# Patient Record
(demographics unavailable — no encounter records)

---

## 2024-10-17 NOTE — HISTORY OF PRESENT ILLNESS
[Y] : Positive pregnancy history [Currently Active] : currently active [Men] : men [No] : No [Mammogramdate] : 2019 [BoneDensityDate] : N/A [ColonoscopyDate] : N/A [LMPDate] : 08/15/24 [PGHxTotal] : 3 [[facility] : [facility] [FreeTextEntry1] : 3

## 2024-11-11 NOTE — ASSESSMENT
[FreeTextEntry1] : In summary, the patient is a 51-year-old woman who is in general asymptomatic and at essentially low risk for coronary disease.  She has no established hypertension diabetes or smoking there is no family history of premature coronary disease.  Her total cholesterol to HDL ratio is less than 4 and her LDL cholesterol is just over 100.  In addition she is premenopausal.  For now I have advised patient that no further workup is necessary at this time.  Have advised her to decrease her candy intake in hopes of reducing her triglyceride level and in hopes of her not developing overt diabetes.  For now I am returning her to the care of her primary physician she can be seen here on an as-needed basis

## 2024-11-11 NOTE — CARDIOLOGY SUMMARY
[de-identified] : 11/11/2024 EKGs done both in primary office and here reveals sinus rhythm and are normal.

## 2024-11-11 NOTE — REASON FOR VISIT
[Other: ____] : [unfilled] [FreeTextEntry1] : This is a 51-year-old woman referred by her primary physician for "a slight abnormality on her EKG".  The patient is in generally good health.  She is active exercising and has no complaints of chest discomfort shortness of breath palpitations dizziness or syncope.  She states that at some point her blood pressure might have been borderline.  She recently was found to have a hemoglobin A1c of 6 and a blood sugar of 126.  She is a non-smoker occasional alcohol user.  Most recent lipid profile reveals a total cholesterol 188 HDL 52 .  There is no significant family history of premature coronary artery disease.  The patient has not yet reached menopause.  Of note is the fact that the patient does eat a significant amount of candy and sweets and her triglyceride level is at 193.

## 2024-11-20 NOTE — HISTORY OF PRESENT ILLNESS
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

## 2024-11-20 NOTE — ASSESSMENT
[FreeTextEntry1] : PHILIP SANDERSON is a 51 year old female with a history of choledocholithiasis s/p ERCP with stone extraction and CCY  #Average risk for colon cancer #History of choledocholithiasis s/p ERCP and ductal clearance, s/p CCY #intermittent reflux symptoms  Recs: - discussed colon cancer screening and options, pt opted for colonoscopy - Benefits and risks of the procedure (including but not limited to pain, infection, bleeding, perforation, injury to internal organs, missed lesions, IV site problems, risks of anesthesia, possible need for further procedures including emergency surgery) were explained to the patient. Alternatives to the procedure were discussed. The patient was agreeable to proceed. - The patient was instructed on the colonoscopy prep, including low fiber diet x 1 week, clear liquid diet x 1 day prior to the procedure, NPO two hours prior to the procedure, and instructions for use of the prescribed prep itself. SUTAB ordered - no PST needed - OTC tums or Pepcid for intermittent reflux symptoms

## 2024-11-20 NOTE — PHYSICAL EXAM
[Alert] : alert [Normal Voice/Communication] : normal voice/communication [Healthy Appearing] : healthy appearing [No Acute Distress] : no acute distress [Sclera] : the sclera and conjunctiva were normal [Hearing Threshold Finger Rub Not Blanco] : hearing was normal [Normal Lips/Gums] : the lips and gums were normal [Oropharynx] : the oropharynx was normal [Normal Appearance] : the appearance of the neck was normal [No Neck Mass] : no neck mass was observed [No Respiratory Distress] : no respiratory distress [No Acc Muscle Use] : no accessory muscle use [Respiration, Rhythm And Depth] : normal respiratory rhythm and effort [Abdomen Tenderness] : non-tender [No Masses] : no abdominal mass palpated [Abdomen Soft] : soft [] : no hepatosplenomegaly [Oriented To Time, Place, And Person] : oriented to person, place, and time

## 2025-01-10 NOTE — ASSESSMENT
Patient would like a call back. States someone from [facility] came to her house and they gave the results to . she would like to over those results   [FreeTextEntry1] : # Aspiration Pt aspirated vomit during colonoscopy yesterday Today she was slightly tachycardic when she came into room, then improved with rest Normal O2 sat on room air Recommend get pulse ox, keep at home and check if symptoms worsen If she has mild worsening of symptoms (more cough, fever, etc) she should start Augmentin and see me Monday If she gets acute worse (severe SOB, etc) she should go to ED  f/u PRN

## 2025-01-10 NOTE — HISTORY OF PRESENT ILLNESS
[FreeTextEntry1] : f/u aspiration during colonoscopy [de-identified] : Pt comes in for f/u - yesterday had colonoscopy and I spoke with her GI who informed me she had episode of vomiting and aspiration during the procedure, which was then terminated early. Has been using incentive spirometer.   Pt feels throat is a bit sore. Drinking a lot of tea/warm liquids. She has a cough - bringing up small amounts of phlegm. Not really short of breath, but if takes deep breaths she feels a little pulling at bottom of lungs, some tenderness.

## 2025-01-10 NOTE — REVIEW OF SYSTEMS
[Sore Throat] : sore throat [Chest Pain] : no chest pain [Shortness Of Breath] : no shortness of breath [Cough] : cough [Dyspnea on Exertion] : no dyspnea on exertion [Negative] : Constitutional [FreeTextEntry6] : can cough up phlegm with hard coughs, small amount

## 2025-01-10 NOTE — PHYSICAL EXAM
[Normal] : no acute distress, well nourished, well developed and well-appearing [Normal Sclera/Conjunctiva] : normal sclera/conjunctiva [Normal Outer Ear/Nose] : the outer ears and nose were normal in appearance [No Respiratory Distress] : no respiratory distress  [No Accessory Muscle Use] : no accessory muscle use [Clear to Auscultation] : lungs were clear to auscultation bilaterally [Normal Affect] : the affect was normal [Alert and Oriented x3] : oriented to person, place, and time [Normal Insight/Judgement] : insight and judgment were intact

## 2025-01-13 NOTE — ASSESSMENT
[FreeTextEntry1] : # Aspiration PNA Pt started augmentin treatment last week per recommendation of GI after getting formal read of CXR done 1/9/25 which showed L lung opacity after she aspirated Feeling slightly better, no signs of progression of PNA Monitor on antibiotics for now If patient worsens (and I counseled her on red flag signs/symptoms) she will call me or go to ED We will repeat the CXR in 6 weeks to monitor for resolution  f/u PRN

## 2025-01-13 NOTE — PHYSICAL EXAM
[Normal] : no acute distress, well nourished, well developed and well-appearing [Normal Outer Ear/Nose] : the outer ears and nose were normal in appearance [No Respiratory Distress] : no respiratory distress  [Normal Affect] : the affect was normal [Alert and Oriented x3] : oriented to person, place, and time [Normal Insight/Judgement] : insight and judgment were intact [de-identified] : No vitals due to virtual visit

## 2025-01-13 NOTE — REVIEW OF SYSTEMS
[Cough] : cough [Negative] : Constitutional [Shortness Of Breath] : no shortness of breath [Dyspnea on Exertion] : no dyspnea on exertion [FreeTextEntry4] : tickle in throat